# Patient Record
Sex: MALE | Race: WHITE
[De-identification: names, ages, dates, MRNs, and addresses within clinical notes are randomized per-mention and may not be internally consistent; named-entity substitution may affect disease eponyms.]

---

## 2019-07-09 ENCOUNTER — HOSPITAL ENCOUNTER (OUTPATIENT)
Dept: HOSPITAL 88 - US | Age: 49
End: 2019-07-09
Attending: UROLOGY
Payer: COMMERCIAL

## 2019-07-09 DIAGNOSIS — N43.40: Primary | ICD-10-CM

## 2019-07-09 DIAGNOSIS — G89.29: ICD-10-CM

## 2019-07-09 PROCEDURE — 76870 US EXAM SCROTUM: CPT

## 2019-07-09 PROCEDURE — 93976 VASCULAR STUDY: CPT

## 2019-07-09 NOTE — DIAGNOSTIC IMAGING REPORT
Exam: Testicular ultrasound.



Clinical History:  Right testicular pain



Findings: 



Grayscale and Doppler sonographic images were obtained of both testes and

scrotum. Symmetric testicular echogenicity with normal blood flow.



Right testis: The right testicle measures 4.5 x 2.3 x 3.4 cm with normal blood

flow. There is a moderate right hydrocele. No varicocele. The right epididymis

measures 1.2 x 1.1 x 0.9 cm and appears unremarkable.



Left testis: The left testicle measures 4.5 x 2.2 x 2.7 cm with normal blood

flow. There is a small left hydrocele. No varicocele. The left epididymis

measures 1.2 x 0.7 x 1.1 cm and appears unremarkable.



Small subcentimeter bilateral inguinal lymph nodes are seen.



Impression:

No evidence of testicular torsion or epididymitis.



Moderate right hydrocele, small left hydrocele.



Signed by: Miguelito Lambert MD on 7/9/2019 4:49 PM